# Patient Record
Sex: MALE | Race: WHITE | NOT HISPANIC OR LATINO | Employment: FULL TIME | URBAN - METROPOLITAN AREA
[De-identification: names, ages, dates, MRNs, and addresses within clinical notes are randomized per-mention and may not be internally consistent; named-entity substitution may affect disease eponyms.]

---

## 2022-09-01 ENCOUNTER — TELEPHONE (OUTPATIENT)
Dept: CARDIOLOGY | Facility: MEDICAL CENTER | Age: 27
End: 2022-09-01
Payer: COMMERCIAL

## 2022-09-01 NOTE — TELEPHONE ENCOUNTER
LVM asking if patient has seen another Cardiologist in the past or had any cardiac testing done outside of [facility] to call with information so that records can be requested prior to appointment. Scheduled with Dr. Pillai on 9/7/22

## 2022-09-04 NOTE — PROGRESS NOTES
CARDIOLOGY CONSULTATION NOTE      Date of Visit: 9/8/2022    Referring Provider: Milligan, Nicolas, M.D.    Patient Name: Olivier Saha    YOB: 1995  MRN: [id number]     Reason for Visit:   Chest pain     Patient Story:   Olivier Saha is a 27 year-old gentleman with a past medical history of hypertension, IgA nephropathy, and ulcerative colitis.     Briefly, he had COVID last year as well as recurrent infection at the beginning of this year followed by the flu.  After having the flu, he noticed that his exertional intolerance decreased significantly to the point where he was barely able to walk up a flight of stairs.  He also noticed intermittent chest discomfort and worsening blood pressure control.  He was started on amlodipine with improvement in his blood pressure and he noted that this also improved his chest pain symptoms.  Over the past 4 months, he has felt that he has slowly been returning back to his previous baseline, but he still is significantly limited by shortness of breath.  He has not been having much chest pain.  His symptoms were evaluated with a chest CT, which demonstrated no significant pulmonary abnormalities and no reported coronary calcification.  He is a non-smoker.  He denied a family history of early coronary artery disease.     Medications and Allergies:     Current Outpatient Medications   Medication Sig Dispense Refill    amLODIPine (NORVASC) 5 MG Tab Take 10 mg by mouth every day.      losartan (COZAAR) 100 MG Tab take 1 tablet by mouth nightly      XELJANZ 5 MG Tab 2 times a day.       No current facility-administered medications for this visit.     No Known Allergies     Medical Decision Making:   # Shortness of breath, chest pain: His symptoms began after having a second bout of COVID and then the flu.  These have been slowly improving over the past 4 months and I suspect his symptoms are related to post-COVID syndrome.  Overall, his risk of having significant  "coronary artery disease is very low and he did not have reported coronary calcifications on his CT scan.  His examination also did not reveal a significant rub or murmur and his ECG was not concerning for pericarditis.  I discussed that, as he was having persistent albeit improving symptoms, we could consider an echocardiogram.  As his symptoms were improving, he wished to wait and see if they completely resolved and consider an echocardiogram if they were not resolving in the next few weeks to months.  I also discussed that I did not feel it was likely he had a serious underlying cardiac problem and that, if his echocardiogram was not concerning, I would recommend evaluation in a post-COVID clinic either at [facility] or [facility].    # Hypertension: He reports a history of hypertension following his diagnosis of IgA nephropathy.  This has been managed with losartan and more recently amlodipine.  His blood pressure was well controlled today and his medical regimen is reasonable to continue.    # Hyperlipidemia: His LDL was mildly elevated, however, he does not meet clear criteria for initiation of statin therapy and he should continue with healthy lifestyle choices with consideration for initiation of statin therapy per his PCP if these are unsuccessful as he does have chronic kidney disease, IgA nephropathy, and ulcerative colitis which we will place him at risk for early development of coronary artery disease as he ages.    Follow Up  As needed if symptoms or not improving     Cardiac Studies and Procedures:   CT/MRI  His CT chest did not demonstrate any significant pulmonary abnormalities.  His heart structure was reportedly normal.    Electrophysiology  ECG (9/7/2022)  Normal sinus rhythm     Vital Signs:   /78 (BP Location: Left arm, Patient Position: Sitting)   Pulse 84   Resp 16   Ht 1.803 m (5' 11\")   Wt 99.8 kg (220 lb)   SpO2 95%    BP Readings from Last 4 Encounters:   09/07/22 124/78     Wt " Readings from Last 4 Encounters:   09/07/22 99.8 kg (220 lb)     Body mass index is 30.68 kg/m².     Laboratories (6/17/2022):   WBC 7.5  Hgb 14.5    ESR 10  BUN 16  Creatinine 1.4  Potassium 4.1  HDL 42    TG 86  Total 100       Physical Examination:   General: Well-appearing, no acute distress  Eyes: Extraocular movements intact, anicteric  Ears: No ear lobe crease  Neck: Supple, full range of motion, no jugular venous distension  Pulmonary: Normal respiratory effort, clear to auscultation bilaterally  Cardiovascular: Regular rate and rhythm, no murmurs or gallops appreciated  Extremities: Warm and well perfused, no lower extremity edema  Neurological: Alert and oriented, no gross focal motor deficits     Past History:   Past Medical History  The patient's past medical history was reviewed.  See HPI and self-reported patient medical history form for pertinent medical history to consultation.    Past Social History  The patient's social history was reviewed.  See [facility] self-reported patient medical history form for pertinent social history to consultation.    Past Family History  The patient's family history was reviewed.  See [facility] self-reported patient medical history form for pertinent family history to consultation.    Review of Systems  A pertinent cardiac review of systems was performed and was otherwise unremarkable except as per HPI and self-reported patient medical history form.        Teddy Pillai MD, [facility]  Interventional Cardiology  [facility] Heart and Vascular [facility], [facility]  [address] [address]  Phone: [phone]  Fax: [phone]

## 2022-09-06 NOTE — TELEPHONE ENCOUNTER
Afternoon-    Caller: Sabrina ( spouse)    Topic/issue: Pt only had testing done by Primary Dr. Nicolas Milligan w/ [facility]. And has had no prior Cardiologists. She wants to make sure we request the records. She believes all of his test were completed with [facility].    Callback Number: [phone] (home)    Dr. Milligan's Fax # [phone]     Thank you,  Latanya WILSON

## 2022-09-07 ENCOUNTER — TELEPHONE (OUTPATIENT)
Dept: CARDIOLOGY | Facility: MEDICAL CENTER | Age: 27
End: 2022-09-07
Payer: COMMERCIAL

## 2022-09-07 ENCOUNTER — OFFICE VISIT (OUTPATIENT)
Dept: CARDIOLOGY | Facility: MEDICAL CENTER | Age: 27
End: 2022-09-07
Payer: COMMERCIAL

## 2022-09-07 VITALS
DIASTOLIC BLOOD PRESSURE: 78 MMHG | OXYGEN SATURATION: 95 % | WEIGHT: 220 LBS | BODY MASS INDEX: 30.8 KG/M2 | SYSTOLIC BLOOD PRESSURE: 124 MMHG | HEIGHT: 71 IN | HEART RATE: 84 BPM | RESPIRATION RATE: 16 BRPM

## 2022-09-07 DIAGNOSIS — R07.9 CHEST PAIN, UNSPECIFIED TYPE: ICD-10-CM

## 2022-09-07 DIAGNOSIS — N02.B9 IGA NEPHROPATHY: ICD-10-CM

## 2022-09-07 DIAGNOSIS — R06.09 DOE (DYSPNEA ON EXERTION): ICD-10-CM

## 2022-09-07 DIAGNOSIS — E78.00 PURE HYPERCHOLESTEROLEMIA: ICD-10-CM

## 2022-09-07 DIAGNOSIS — I15.9 SECONDARY HYPERTENSION: ICD-10-CM

## 2022-09-07 DIAGNOSIS — K51.919 ULCERATIVE COLITIS WITH COMPLICATION, UNSPECIFIED LOCATION (HCC): ICD-10-CM

## 2022-09-07 PROCEDURE — 93000 ELECTROCARDIOGRAM COMPLETE: CPT | Performed by: INTERNAL MEDICINE

## 2022-09-07 PROCEDURE — 99204 OFFICE O/P NEW MOD 45 MIN: CPT | Performed by: INTERNAL MEDICINE

## 2022-09-07 RX ORDER — TOFACITINIB 5 MG/1
TABLET, FILM COATED ORAL 2 TIMES DAILY
COMMUNITY
Start: 2022-09-03

## 2022-09-07 RX ORDER — FUROSEMIDE 20 MG/1
20 TABLET ORAL DAILY
COMMUNITY
Start: 2022-06-10 | End: 2022-09-07

## 2022-09-07 RX ORDER — LOSARTAN POTASSIUM 25 MG/1
TABLET ORAL
COMMUNITY
Start: 2022-06-21 | End: 2022-09-07

## 2022-09-07 RX ORDER — LOSARTAN POTASSIUM 100 MG/1
100 TABLET ORAL
COMMUNITY
Start: 2022-07-07

## 2022-09-07 RX ORDER — OSELTAMIVIR PHOSPHATE 75 MG/1
CAPSULE ORAL
COMMUNITY
Start: 2022-04-14 | End: 2022-09-07

## 2022-09-07 RX ORDER — LOSARTAN POTASSIUM 25 MG/1
TABLET ORAL
COMMUNITY
End: 2022-09-07

## 2022-09-07 RX ORDER — TOFACITINIB 5 MG/1
TABLET, FILM COATED ORAL
COMMUNITY
End: 2022-09-07

## 2022-09-07 RX ORDER — AMLODIPINE BESYLATE 5 MG/1
10 TABLET ORAL NIGHTLY
COMMUNITY
Start: 2022-08-22

## 2022-09-07 NOTE — TELEPHONE ENCOUNTER
Faxed request records to Dr. Nicolas Milligan fax # [phone] per wife called yesterday.    Called [facility] requested cardiac testing done. Only a chest CT done in June. Requested.

## 2022-09-08 PROBLEM — E78.00 PURE HYPERCHOLESTEROLEMIA: Status: ACTIVE | Noted: 2022-09-08

## 2022-09-08 PROBLEM — R06.09 DOE (DYSPNEA ON EXERTION): Status: ACTIVE | Noted: 2022-09-08

## 2022-09-08 PROBLEM — K51.90 ULCERATIVE COLITIS (HCC): Status: ACTIVE | Noted: 2022-09-08

## 2022-09-08 PROBLEM — I15.9 SECONDARY HYPERTENSION: Status: ACTIVE | Noted: 2022-09-08

## 2022-09-08 PROBLEM — F90.2 ATTENTION DEFICIT HYPERACTIVITY DISORDER (ADHD), COMBINED TYPE: Status: ACTIVE | Noted: 2017-12-21

## 2022-09-08 PROBLEM — F41.9 ANXIETY: Status: ACTIVE | Noted: 2017-12-21

## 2022-09-08 PROBLEM — N02.B9 IGA NEPHROPATHY: Status: ACTIVE | Noted: 2022-09-08

## 2022-09-11 LAB — EKG IMPRESSION: NORMAL

## 2022-10-03 ENCOUNTER — PRE-ADMISSION TESTING (OUTPATIENT)
Dept: ADMISSIONS | Facility: MEDICAL CENTER | Age: 27
End: 2022-10-03
Attending: STUDENT IN AN ORGANIZED HEALTH CARE EDUCATION/TRAINING PROGRAM
Payer: COMMERCIAL

## 2022-10-17 ENCOUNTER — APPOINTMENT (OUTPATIENT)
Dept: RADIOLOGY | Facility: MEDICAL CENTER | Age: 27
End: 2022-10-17
Attending: STUDENT IN AN ORGANIZED HEALTH CARE EDUCATION/TRAINING PROGRAM
Payer: COMMERCIAL

## 2022-10-17 ENCOUNTER — HOSPITAL ENCOUNTER (OUTPATIENT)
Facility: MEDICAL CENTER | Age: 27
End: 2022-10-17
Attending: STUDENT IN AN ORGANIZED HEALTH CARE EDUCATION/TRAINING PROGRAM | Admitting: STUDENT IN AN ORGANIZED HEALTH CARE EDUCATION/TRAINING PROGRAM
Payer: COMMERCIAL

## 2022-10-17 VITALS
TEMPERATURE: 97.2 F | SYSTOLIC BLOOD PRESSURE: 118 MMHG | BODY MASS INDEX: 30.65 KG/M2 | RESPIRATION RATE: 16 BRPM | DIASTOLIC BLOOD PRESSURE: 84 MMHG | HEART RATE: 68 BPM | WEIGHT: 218.92 LBS | HEIGHT: 71 IN | OXYGEN SATURATION: 96 %

## 2022-10-17 DIAGNOSIS — N18.31 CHRONIC KIDNEY DISEASE (CKD) STAGE G3A/A1, MODERATELY DECREASED GLOMERULAR FILTRATION RATE (GFR) BETWEEN 45-59 ML/MIN/1.73 SQUARE METER AND ALBUMINURIA CREATININE RATIO LESS THAN 30 MG/G: ICD-10-CM

## 2022-10-17 DIAGNOSIS — N02.B9 IGA NEPHROPATHY: ICD-10-CM

## 2022-10-17 LAB
ANION GAP SERPL CALC-SCNC: 10 MMOL/L (ref 7–16)
BUN SERPL-MCNC: 19 MG/DL (ref 8–22)
CALCIUM SERPL-MCNC: 8.8 MG/DL (ref 8.5–10.5)
CHLORIDE SERPL-SCNC: 106 MMOL/L (ref 96–112)
CO2 SERPL-SCNC: 24 MMOL/L (ref 20–33)
CREAT SERPL-MCNC: 1.44 MG/DL (ref 0.5–1.4)
ERYTHROCYTE [DISTWIDTH] IN BLOOD BY AUTOMATED COUNT: 37.9 FL (ref 35.9–50)
GFR SERPLBLD CREATININE-BSD FMLA CKD-EPI: 68 ML/MIN/1.73 M 2
GLUCOSE SERPL-MCNC: 93 MG/DL (ref 65–99)
HCT VFR BLD AUTO: 43 % (ref 42–52)
HGB BLD-MCNC: 14.9 G/DL (ref 14–18)
INR PPP: 0.96 (ref 0.87–1.13)
MCH RBC QN AUTO: 28.5 PG (ref 27–33)
MCHC RBC AUTO-ENTMCNC: 34.7 G/DL (ref 33.7–35.3)
MCV RBC AUTO: 82.4 FL (ref 81.4–97.8)
PLATELET # BLD AUTO: 367 K/UL (ref 164–446)
PMV BLD AUTO: 9.8 FL (ref 9–12.9)
POTASSIUM SERPL-SCNC: 4.1 MMOL/L (ref 3.6–5.5)
PROTHROMBIN TIME: 12.7 SEC (ref 12–14.6)
RBC # BLD AUTO: 5.22 M/UL (ref 4.7–6.1)
SODIUM SERPL-SCNC: 140 MMOL/L (ref 135–145)
WBC # BLD AUTO: 6.2 K/UL (ref 4.8–10.8)

## 2022-10-17 PROCEDURE — 160035 HCHG PACU - 1ST 60 MINS PHASE I

## 2022-10-17 PROCEDURE — 160046 HCHG PACU - 1ST 60 MINS PHASE II

## 2022-10-17 PROCEDURE — 85027 COMPLETE CBC AUTOMATED: CPT

## 2022-10-17 PROCEDURE — 88300 SURGICAL PATH GROSS: CPT

## 2022-10-17 PROCEDURE — 85610 PROTHROMBIN TIME: CPT

## 2022-10-17 PROCEDURE — 160036 HCHG PACU - EA ADDL 30 MINS PHASE I

## 2022-10-17 PROCEDURE — 80048 BASIC METABOLIC PNL TOTAL CA: CPT

## 2022-10-17 PROCEDURE — 700111 HCHG RX REV CODE 636 W/ 250 OVERRIDE (IP): Performed by: RADIOLOGY

## 2022-10-17 PROCEDURE — 700111 HCHG RX REV CODE 636 W/ 250 OVERRIDE (IP)

## 2022-10-17 PROCEDURE — 36415 COLL VENOUS BLD VENIPUNCTURE: CPT

## 2022-10-17 PROCEDURE — 77012 CT SCAN FOR NEEDLE BIOPSY: CPT

## 2022-10-17 PROCEDURE — 160002 HCHG RECOVERY MINUTES (STAT)

## 2022-10-17 RX ORDER — MIDAZOLAM HYDROCHLORIDE 1 MG/ML
.5-2 INJECTION INTRAMUSCULAR; INTRAVENOUS PRN
Status: DISCONTINUED | OUTPATIENT
Start: 2022-10-17 | End: 2022-10-17

## 2022-10-17 RX ORDER — OXYCODONE HYDROCHLORIDE 5 MG/1
10 TABLET ORAL
Status: DISCONTINUED | OUTPATIENT
Start: 2022-10-17 | End: 2022-10-17 | Stop reason: HOSPADM

## 2022-10-17 RX ORDER — OXYCODONE HYDROCHLORIDE 5 MG/1
5 TABLET ORAL
Status: DISCONTINUED | OUTPATIENT
Start: 2022-10-17 | End: 2022-10-17 | Stop reason: HOSPADM

## 2022-10-17 RX ORDER — SODIUM CHLORIDE 9 MG/ML
500 INJECTION, SOLUTION INTRAVENOUS
Status: DISPENSED | OUTPATIENT
Start: 2022-10-17 | End: 2022-10-17

## 2022-10-17 RX ORDER — LIDOCAINE HYDROCHLORIDE 10 MG/ML
INJECTION, SOLUTION EPIDURAL; INFILTRATION; INTRACAUDAL; PERINEURAL
Status: DISCONTINUED
Start: 2022-10-17 | End: 2022-10-17 | Stop reason: HOSPADM

## 2022-10-17 RX ORDER — HYDROMORPHONE HYDROCHLORIDE 1 MG/ML
0.5 INJECTION, SOLUTION INTRAMUSCULAR; INTRAVENOUS; SUBCUTANEOUS
Status: DISCONTINUED | OUTPATIENT
Start: 2022-10-17 | End: 2022-10-17 | Stop reason: HOSPADM

## 2022-10-17 RX ORDER — ONDANSETRON 2 MG/ML
4 INJECTION INTRAMUSCULAR; INTRAVENOUS EVERY 8 HOURS PRN
Status: DISCONTINUED | OUTPATIENT
Start: 2022-10-17 | End: 2022-10-17 | Stop reason: HOSPADM

## 2022-10-17 RX ORDER — ONDANSETRON 2 MG/ML
4 INJECTION INTRAMUSCULAR; INTRAVENOUS PRN
Status: DISCONTINUED | OUTPATIENT
Start: 2022-10-17 | End: 2022-10-17

## 2022-10-17 RX ORDER — MIDAZOLAM HYDROCHLORIDE 1 MG/ML
INJECTION INTRAMUSCULAR; INTRAVENOUS
Status: COMPLETED
Start: 2022-10-17 | End: 2022-10-17

## 2022-10-17 RX ORDER — SODIUM CHLORIDE 9 MG/ML
INJECTION, SOLUTION INTRAVENOUS ONCE
Status: DISCONTINUED | OUTPATIENT
Start: 2022-10-17 | End: 2022-10-17 | Stop reason: HOSPADM

## 2022-10-17 RX ADMIN — MIDAZOLAM 1 MG: 1 INJECTION INTRAMUSCULAR; INTRAVENOUS at 13:08

## 2022-10-17 RX ADMIN — FENTANYL CITRATE 25 MCG: 50 INJECTION, SOLUTION INTRAMUSCULAR; INTRAVENOUS at 13:12

## 2022-10-17 RX ADMIN — FENTANYL CITRATE 25 MCG: 50 INJECTION, SOLUTION INTRAMUSCULAR; INTRAVENOUS at 13:08

## 2022-10-17 RX ADMIN — MIDAZOLAM HYDROCHLORIDE 1 MG: 1 INJECTION, SOLUTION INTRAMUSCULAR; INTRAVENOUS at 13:08

## 2022-10-17 RX ADMIN — MIDAZOLAM HYDROCHLORIDE 1 MG: 1 INJECTION, SOLUTION INTRAMUSCULAR; INTRAVENOUS at 13:14

## 2022-10-17 ASSESSMENT — PAIN DESCRIPTION - PAIN TYPE
TYPE: SURGICAL PAIN

## 2022-10-17 NOTE — H&P
History and Physical    Date: 10/17/2022    PCP: Nicolas Milligan, M.D.      CC: IGA Nephropathy    HPI: This is a 27 y.o. male who is presenting for renal iopsy    Past Medical History:   Diagnosis Date    Chronic kidney disease, stage 3a (HCC)     Hypertension     IgA nephropathy     UC (ulcerative colitis) (HCC)        Past Surgical History:   Procedure Laterality Date    BIOPSY GENERAL      renal    COLONOSCOPY         No current facility-administered medications for this encounter.        Social History     Socioeconomic History    Marital status:      Spouse name: Not on file    Number of children: Not on file    Years of education: Not on file    Highest education level: Not on file   Occupational History    Not on file   Tobacco Use    Smoking status: Never    Smokeless tobacco: Never   Vaping Use    Vaping Use: Never used   Substance and Sexual Activity    Alcohol use: Not Currently    Drug use: Not Currently    Sexual activity: Not on file   Other Topics Concern    Not on file   Social History Narrative    Not on file     Social Determinants of Health     Financial Resource Strain: Not on file   Food Insecurity: Not on file   Transportation Needs: Not on file   Physical Activity: Not on file   Stress: Not on file   Social Connections: Not on file   Intimate Partner Violence: Not on file   Housing Stability: Not on file       History reviewed. No pertinent family history.    Allergies:  Patient has no known allergies.    Review of Systems:  Negative except for IgA Nephropathy    Physical Exam    Vital Signs  Blood Pressure: (!) 151/76   Temperature: 36.6 °C (97.8 °F)   Pulse: 69   Respiration: 14   Pulse Oximetry: 96 %        Labs:                    Radiology:  CT-[facility] BX-RENAL    (Results Pending)             Assessment and Plan:This is a 27 y.o. presentws for CT guided kidney biopsy

## 2022-10-17 NOTE — OR NURSING
Patient A+Ox4. Denies pain or nausea.  Dressing to left flank clean and dry.  VSS. To lay flat x 2 hours post op.  Then patient may discharge home.

## 2022-10-17 NOTE — OR NURSING
Mom given update.  VSS. Will transfer to stage 2 when bedrest completed.  Left flank dressing clean and dry.

## 2022-10-17 NOTE — PROGRESS NOTES
Pt presents to CT 4. Pt was consented by MD at bedside, confirmed by this RN and consent at bedside. Pt transferred to CT 4 table in Prone position. Patient underwent a CT Guided Non-Targeted Renal Biopsy by Dr. Ferreira. Procedure site was marked by MD and verified using imaging guidance.     Pt placed on monitor, prepped and draped in a sterile fashion. Vitals were taken every 5 minutes and remained stable during procedure (see doc flow sheet for results). CO2 waveform capnography was monitored and remained WNL throughout procedure.     Left Flank site with  gauze and tegaderm, clean, dry and intact.     Report called to Harm[facility] BOLDEN. [facility]. Core Labs X 2 handed to pathology at bedside.

## 2022-10-17 NOTE — OR NURSING
Assumed care of patient, PIV inserted, Allergies reviewed, Consent Signed, family at bedside, questions answered, call light in reach.

## 2022-10-17 NOTE — OR NURSING
Pt's VSS; denies N/V; denies pain. Dressing CDI to L flank. D/c orders received. IV dc'd. Pt changed into clothing with assistance. Discharge instructions given as well as pain management handout; pt and family verbalized understanding and questions answered. Patient states ready to d/c home. No prescriptions given. Pt dc'd in w/c with RN in stable condition.

## 2022-10-17 NOTE — DISCHARGE INSTRUCTIONS
HOME CARE INSTRUCTIONS    ACTIVITY: Rest and take it easy for the first 24 hours.  A responsible adult is recommended to remain with you during that time.  It is normal to feel sleepy.  We encourage you to not do anything that requires balance, judgment or coordination.    FOR 24 HOURS DO NOT:  Drive, operate machinery or run household appliances.  Drink beer or alcoholic beverages.  Make important decisions or sign legal documents.    SPECIAL INSTRUCTIONS:     Percutaneous Kidney Biopsy, Care After  This sheet gives you information about how to care for yourself after your procedure. Your health care provider may also give you more specific instructions. If you have problems or questions, contact your health care provider.  What can I expect after the procedure?  After the procedure, it is common to have:  Pain or soreness near the area where the needle went through your skin (biopsy site).  Bright pink or cloudy urine for 24 hours after the procedure.  Follow these instructions at home:  Activity  Return to your normal activities as told by your health care provider. Ask your health care provider what activities are safe for you.  Do not drive for 24 hours if you were given a medicine to help you relax (sedative).  Do not lift anything that is heavier than 10 lb (4.5 kg) until your health care provider tells you that it is safe.  Avoid activities that take a lot of effort (are strenuous) until your health care provider approves. Most people will have to wait 2 weeks before returning to activities such as exercise or sexual intercourse.  General instructions  Take over-the-counter and prescription medicines only as told by your health care provider.  You may eat and drink after your procedure. Follow instructions from your health care provider about eating or drinking restrictions.  Check your biopsy site every day for signs of infection. Check for:  More redness, swelling, or pain.  More fluid or  blood.  Warmth.  Pus or a bad smell.  Keep all follow-up visits as told by your health care provider. This is important.  Contact a health care provider if:  You have more redness, swelling, or pain around your biopsy site.  You have more fluid or blood coming from your biopsy site.  Your biopsy site feels warm to the touch.  You have pus or a bad smell coming from your biopsy site.  You have blood in your urine more than 24 hours after your procedure.  Get help right away if:  You have dark red or brown urine.  You have a fever.  You are unable to urinate.  You feel burning when you urinate.  You feel faint.  You have severe pain in your abdomen or side.  This information is not intended to replace advice given to you by your health care provider. Make sure you discuss any questions you have with your health care provider.    DIET: To avoid nausea, slowly advance diet as tolerated, avoiding spicy or greasy foods for the first day.  Add more substantial food to your diet according to your physician's instructions.  Babies can be fed formula or breast milk as soon as they are hungry.  INCREASE FLUIDS AND FIBER TO AVOID CONSTIPATION.    SURGICAL DRESSING/BATHING: Maintain dressing for 24 hours. Okay to remove and shower after that. Do not submerge in water for 1 week (hot tubs, baths, swimming pools).    MEDICATIONS: Resume taking daily medication.  Take prescribed pain medication with food.  If no medication is prescribed, you may take non-aspirin pain medication if needed.  PAIN MEDICATION CAN BE VERY CONSTIPATING.  Take a stool softener or laxative such as senokot, pericolace, or milk of magnesia if needed.    A follow-up appointment should be arranged with your doctor in 1-2 weeks; call to schedule.  1  You should CALL YOUR PHYSICIAN if you develop:  Fever greater than 101 degrees F.  Pain not relieved by medication, or persistent nausea or vomiting.  Excessive bleeding (blood soaking through dressing) or  unexpected drainage from the wound.  Extreme redness or swelling around the incision site, drainage of pus or foul smelling drainage.  Inability to urinate or empty your bladder within 8 hours.  Problems with breathing or chest pain.    You should call 911 if you develop problems with breathing or chest pain.  If you are unable to contact your doctor or surgical center, you should go to the nearest emergency room or urgent care center.  Physician's telephone #: Dr. Ren [phone].    MILD FLU-LIKE SYMPTOMS ARE NORMAL.  YOU MAY EXPERIENCE GENERALIZED MUSCLE ACHES, THROAT IRRITATION, HEADACHE AND/OR SOME NAUSEA.    If any questions arise, call your doctor.  If your doctor is not available, please feel free to call the Surgical Center at [phone].  The Center is open Monday through Friday from 7AM to 7PM.      A registered nurse may call you a few days after your surgery to see how you are doing after your procedure.    You may also receive a survey in the mail within the next two weeks and we ask that you take a few moments to complete the survey and return it to us.  Our goal is to provide you with very good care and we value your comments.     Depression / Suicide Risk    As you are discharged from this [facility] facility, it is important to learn how to keep safe from harming yourself.    Recognize the warning signs:  Abrupt changes in personality, positive or negative- including increase in energy   Giving away possessions  Change in eating patterns- significant weight changes-  positive or negative  Change in sleeping patterns- unable to sleep or sleeping all the time   Unwillingness or inability to communicate  Depression  Unusual sadness, discouragement and loneliness  Talk of wanting to die  Neglect of personal appearance   Rebelliousness- reckless behavior  Withdrawal from people/activities they love  Confusion- inability to concentrate     If you or a loved one observes any of these behaviors  or has concerns about self-harm, here's what you can do:  Talk about it- your feelings and reasons for harming yourself  Remove any means that you might use to hurt yourself (examples: pills, rope, extension cords, firearm)  Get professional help from the community (Mental Health, Substance Abuse, psychological counseling)  Do not be alone:Call your Safe Contact- someone whom you trust who will be there for you.  Call your local CRISIS HOTLINE 360-8660 or 498-943-0862  Call your local Children's Mobile Crisis Response Team [place] (078) 200-0038 or www.[employer]  Call the toll free National Suicide Prevention Hotlines   National Suicide Prevention Lifeline 468-266-UQOB (5120)  National Hope Line Network 800-SUICIDE (265-1266)    I acknowledge receipt and understanding of these Home Care instructions.

## 2022-10-25 LAB — PATHOLOGY CONSULT NOTE: NORMAL

## 2022-12-02 NOTE — H&P
.History and Physical      Cardiac:   Regular rate and rhythm .  No murmers    Lungs:  Clear